# Patient Record
Sex: MALE | URBAN - METROPOLITAN AREA
[De-identification: names, ages, dates, MRNs, and addresses within clinical notes are randomized per-mention and may not be internally consistent; named-entity substitution may affect disease eponyms.]

---

## 2017-08-01 ENCOUNTER — APPOINTMENT (RX ONLY)
Dept: URBAN - METROPOLITAN AREA CLINIC 106 | Facility: CLINIC | Age: 62
Setting detail: DERMATOLOGY
End: 2017-08-01

## 2017-08-01 DIAGNOSIS — L91.8 OTHER HYPERTROPHIC DISORDERS OF THE SKIN: ICD-10-CM

## 2017-08-01 DIAGNOSIS — L85.3 XEROSIS CUTIS: ICD-10-CM

## 2017-08-01 DIAGNOSIS — L82.1 OTHER SEBORRHEIC KERATOSIS: ICD-10-CM

## 2017-08-01 PROBLEM — L29.8 OTHER PRURITUS: Status: ACTIVE | Noted: 2017-08-01

## 2017-08-01 PROCEDURE — 99202 OFFICE O/P NEW SF 15 MIN: CPT

## 2017-08-01 PROCEDURE — ? OTHER

## 2017-08-01 PROCEDURE — ? COUNSELING

## 2017-08-01 ASSESSMENT — LOCATION SIMPLE DESCRIPTION DERM
LOCATION SIMPLE: CHEST
LOCATION SIMPLE: ABDOMEN
LOCATION SIMPLE: LEFT CHEEK
LOCATION SIMPLE: RIGHT BREAST

## 2017-08-01 ASSESSMENT — LOCATION DETAILED DESCRIPTION DERM
LOCATION DETAILED: LEFT INFERIOR CENTRAL MALAR CHEEK
LOCATION DETAILED: LEFT RIB CAGE
LOCATION DETAILED: LEFT MEDIAL SUPERIOR CHEST
LOCATION DETAILED: RIGHT INFRAMAMMARY CREASE

## 2017-08-01 ASSESSMENT — LOCATION ZONE DERM
LOCATION ZONE: TRUNK
LOCATION ZONE: FACE

## 2017-08-01 NOTE — PROCEDURE: OTHER
Other (Free Text): Scissor snip x 5 right and left axillae.
Note Text (......Xxx Chief Complaint.): This diagnosis correlates with the
Detail Level: Simple